# Patient Record
Sex: MALE | Race: WHITE | NOT HISPANIC OR LATINO | Employment: OTHER | URBAN - METROPOLITAN AREA
[De-identification: names, ages, dates, MRNs, and addresses within clinical notes are randomized per-mention and may not be internally consistent; named-entity substitution may affect disease eponyms.]

---

## 2018-10-30 ENCOUNTER — ANESTHESIA - HEALTHEAST (OUTPATIENT)
Dept: SURGERY | Facility: HOSPITAL | Age: 68
End: 2018-10-30

## 2018-10-30 ASSESSMENT — MIFFLIN-ST. JEOR: SCORE: 1796.8

## 2018-10-31 ENCOUNTER — SURGERY - HEALTHEAST (OUTPATIENT)
Dept: SURGERY | Facility: HOSPITAL | Age: 68
End: 2018-10-31

## 2018-10-31 ASSESSMENT — MIFFLIN-ST. JEOR: SCORE: 1796.8

## 2020-08-10 ENCOUNTER — HOSPITAL ENCOUNTER (OUTPATIENT)
Dept: GENERAL RADIOLOGY | Facility: OTHER | Age: 70
End: 2020-08-10
Attending: FAMILY MEDICINE
Payer: COMMERCIAL

## 2020-08-10 ENCOUNTER — OFFICE VISIT (OUTPATIENT)
Dept: FAMILY MEDICINE | Facility: OTHER | Age: 70
End: 2020-08-10
Attending: FAMILY MEDICINE
Payer: COMMERCIAL

## 2020-08-10 VITALS
RESPIRATION RATE: 20 BRPM | WEIGHT: 243.8 LBS | DIASTOLIC BLOOD PRESSURE: 84 MMHG | HEART RATE: 72 BPM | OXYGEN SATURATION: 94 % | TEMPERATURE: 96.9 F | SYSTOLIC BLOOD PRESSURE: 152 MMHG

## 2020-08-10 DIAGNOSIS — S69.91XA INJURY OF RIGHT WRIST, INITIAL ENCOUNTER: ICD-10-CM

## 2020-08-10 DIAGNOSIS — S20.212A CONTUSION OF RIB ON LEFT SIDE, INITIAL ENCOUNTER: Primary | ICD-10-CM

## 2020-08-10 DIAGNOSIS — R07.81 RIB PAIN ON LEFT SIDE: ICD-10-CM

## 2020-08-10 DIAGNOSIS — S62.111A CLOSED CHIP FRACTURE OF TRIQUETRUM OF RIGHT WRIST, INITIAL ENCOUNTER: ICD-10-CM

## 2020-08-10 PROBLEM — I25.118 ATHEROSCLEROSIS OF NATIVE CORONARY ARTERY OF NATIVE HEART WITH STABLE ANGINA PECTORIS (H): Status: ACTIVE | Noted: 2018-03-21

## 2020-08-10 PROBLEM — E78.2 MIXED HYPERLIPIDEMIA: Status: ACTIVE | Noted: 2017-11-13

## 2020-08-10 PROBLEM — M17.12 PRIMARY OSTEOARTHRITIS OF LEFT KNEE: Status: ACTIVE | Noted: 2019-10-16

## 2020-08-10 PROCEDURE — 71101 X-RAY EXAM UNILAT RIBS/CHEST: CPT | Mod: RT

## 2020-08-10 PROCEDURE — 73110 X-RAY EXAM OF WRIST: CPT | Mod: RT

## 2020-08-10 PROCEDURE — 99202 OFFICE O/P NEW SF 15 MIN: CPT | Performed by: FAMILY MEDICINE

## 2020-08-10 PROCEDURE — G0463 HOSPITAL OUTPT CLINIC VISIT: HCPCS

## 2020-08-10 PROCEDURE — 71101 X-RAY EXAM UNILAT RIBS/CHEST: CPT | Mod: LT

## 2020-08-10 RX ORDER — ROSUVASTATIN CALCIUM 40 MG/1
40 TABLET, COATED ORAL
COMMUNITY
Start: 2020-02-13

## 2020-08-10 RX ORDER — OXYBUTYNIN CHLORIDE 15 MG/1
15 TABLET, EXTENDED RELEASE ORAL
COMMUNITY
Start: 2019-05-08

## 2020-08-10 RX ORDER — ASPIRIN 81 MG/1
81 TABLET ORAL DAILY
COMMUNITY
Start: 2018-10-31

## 2020-08-10 RX ORDER — HYDRALAZINE HYDROCHLORIDE 50 MG/1
50 TABLET, FILM COATED ORAL 3 TIMES DAILY
COMMUNITY
Start: 2020-02-13 | End: 2021-02-12

## 2020-08-10 RX ORDER — AMLODIPINE BESYLATE 5 MG/1
5 TABLET ORAL DAILY
COMMUNITY

## 2020-08-10 RX ORDER — ATENOLOL 25 MG/1
25 TABLET ORAL
COMMUNITY

## 2020-08-10 RX ORDER — NITROGLYCERIN 0.4 MG/1
0.4 TABLET SUBLINGUAL
COMMUNITY

## 2020-08-10 RX ORDER — ATORVASTATIN CALCIUM 80 MG/1
80 TABLET, FILM COATED ORAL
COMMUNITY

## 2020-08-10 RX ORDER — ZOLPIDEM TARTRATE 10 MG/1
10 TABLET ORAL
COMMUNITY
Start: 2020-03-23

## 2020-08-10 RX ORDER — ISOSORBIDE MONONITRATE 30 MG/1
30 TABLET, EXTENDED RELEASE ORAL
COMMUNITY

## 2020-08-10 RX ORDER — DIAZEPAM 5 MG
5-10 TABLET ORAL
COMMUNITY
Start: 2020-08-02

## 2020-08-10 RX ORDER — OXYCODONE AND ACETAMINOPHEN 5; 325 MG/1; MG/1
1-2 TABLET ORAL
COMMUNITY
Start: 2018-10-31

## 2020-08-10 RX ORDER — METOPROLOL SUCCINATE 25 MG/1
TABLET, EXTENDED RELEASE ORAL
COMMUNITY
Start: 2019-02-22

## 2020-08-10 RX ORDER — NYSTATIN 100000 U/G
CREAM TOPICAL
COMMUNITY
Start: 2019-03-22

## 2020-08-10 ASSESSMENT — PAIN SCALES - GENERAL: PAINLEVEL: WORST PAIN (10)

## 2020-08-10 NOTE — NURSING NOTE
Chief Complaint   Patient presents with     Musculoskeletal Problem     Patient is here for pain in his right wrist and left ribs that started last night around 6pm after he fell down some steps. Patient did try 2 percocet with no relief.     Initial BP (!) 152/84   Pulse 72   Temp 96.9  F (36.1  C) (Tympanic)   Resp 20   Wt 110.6 kg (243 lb 12.8 oz)   SpO2 94%  There is no height or weight on file to calculate BMI.  Medication Reconciliation: complete    Emily Arreaga LPN

## 2020-08-10 NOTE — PROGRESS NOTES
"xr wrist  Nursing Notes:   Emily Arreaga LPN  8/10/2020  9:53 AM  Sign at exiting of workspace  Chief Complaint   Patient presents with     Musculoskeletal Problem     Patient is here for pain in his right wrist and left ribs that started last night around 6pm after he fell down some steps. Patient did try 2 percocet with no relief.     Initial BP (!) 152/84   Pulse 72   Temp 96.9  F (36.1  C) (Tympanic)   Resp 20   Wt 110.6 kg (243 lb 12.8 oz)   SpO2 94%  There is no height or weight on file to calculate BMI.  Medication Reconciliation: complete    Emily Arreaga LPN    SUBJECTIVE:  Jhonny Lovett is a 70 year old male who sustained a right wrist injury last evening.  Mechanism of injury: He was taking a suitcase and other things into a hotel room, tripped on a step and fell. Uncertain if wrist struck ground but fell more onto his left side and has left rib area pain. No breathing difficulties. Did not ice. Took percocet he has for back pain and \"it didn't help much\". Here for a short vacation and had planned to golf. Immediate symptoms: immediate pain. Symptoms have been unchanged since that time. Prior history of related problems: no prior problems with this area in the past.    OBJECTIVE:  Vital signs:  Temp: 96.9  F (36.1  C) Temp src: Tympanic BP: (!) 152/84 Pulse: 72   Resp: 20 SpO2: 94 %       Weight: 110.6 kg (243 lb 12.8 oz)  There is no height or weight on file to calculate BMI.    Appearance: cooperative.  Wrist exam: soft tissue tenderness and swelling at the distal radius without deformity.  Tender left anterior lower ribs without crepitus. Lungs clear on auscultation.   X-ray:  Results for orders placed or performed in visit on 08/10/20   XR Ribs & Chest Lt 3v     Status: None    Narrative    PROCEDURE: XR RIBS & CHEST LT 3VW 8/10/2020 10:26 AM    HISTORY: Rib pain on left side    COMPARISONS: None.    TECHNIQUE: PA view of the chest and 3 views of left ribs.    FINDINGS: Heart is probably " normal in size. There is some mild diffuse  interstitial prominence without confluent infiltrate or pleural  effusion. Without prior images it is uncertain if this is due to some  chronic interstitial fibrosis or more acute interstitial infiltrate or  edema. Chronic changes are favored.    No rib fracture is seen. There is no pneumothorax.         Impression    IMPRESSION: No acute fracture.    JOAQUIM SEGURA MD   Results for orders placed or performed during the hospital encounter of 08/10/20   XR Wrist Right G/E 3 Views     Status: None    Narrative    PROCEDURE: XR WRIST RT G/E 3 VW 8/10/2020 10:27 AM    HISTORY: Injury of right wrist, initial encounter    COMPARISONS: None.    TECHNIQUE: 3 views.    FINDINGS: A small bony density seen dorsally on the lateral view is  consistent with a triquetral chip fracture.    No other fracture or dislocation is seen.    Mild degenerative changes seen in the first carpal metacarpal joint  and in the metacarpal phalangeal joint of the thumb.         Impression    IMPRESSION: Small triquetral radial chip fracture.    JOAQUIM SEGURA MD     I have personally reviewed the x rays listed above.      ASSESSMENT:  1. Contusion of rib on left side, initial encounter    2. Injury of right wrist, initial encounter    3. Rib pain on left side    4. Closed chip fracture of triquetrum of right wrist, initial encounter            PLAN:  No immobilization needed for the chip noted as he is having minimal pain at site. Ice and NSAIDs prn and has percocet from his provider.  Ribs will be sore for quite some time.   Follow up if new issues or not improving.  Kirsten Mirza MD  11:47 AM 8/10/2020

## 2020-08-10 NOTE — PATIENT INSTRUCTIONS
Patient Education     Rib Contusion     A rib contusion is a bruise to one or more rib bones. It may cause pain, tenderness, swelling and a purplish discoloration. There may be a sharp pain while breathing.  You will be assessed for other injuries. You will likely be given pain medicine. Rib contusions heal on their own, without further treatment. However, pain may take weeks to months to go away.   Note that a small crack (fracture) in the rib may cause the same symptoms as a rib contusion. The small crack may not be seen on a chest X-ray. However, the conditions are managed in the same way.  Home care    Rest. Avoid heavy lifting, strenuous exertion, or any activity that causes pain.    Ice the area to reduce pain and swelling. Put ice cubes in a plastic bag or use a cold pack. (Wrap the cold source in a thin towel. Do not place it directly on your skin.) Ice the injured area for 20 minutes every 1 to 2 hours the first day. Continue with ice packs 3 to 4 times a day for the next 2 days, then as needed for the relief of pain and swelling.    Take any prescribed pain medicine as directed by your healthcare provider. If none was prescribed, take acetaminophen, ibuprofen, or naproxen to control pain.    If you have a significant injury, you may be given a device called an incentive spirometer to keep your lungs healthy. Use as directed.  Follow-up care  Follow up with your healthcare provider during the next week or as directed.  When to seek medical advice  Call your healthcare provider for any of the following:    Shortness of breath or trouble breathing    Increasing chest pain with breathing    Coughing    Dizziness, weakness, or fainting    New or worsening pain    Fever of 100.4 F (38 C) or higher, or as directed by your healthcare provider  Date Last Reviewed: 2/1/2017 2000-2019 [employer]. [address] [place] [address], [place] [address]. All rights reserved. This information is not intended as a  substitute for professional medical care. Always follow your healthcare professional's instructions.

## 2020-08-14 ENCOUNTER — TELEPHONE (OUTPATIENT)
Dept: FAMILY MEDICINE | Facility: OTHER | Age: 70
End: 2020-08-14

## 2020-08-16 NOTE — TELEPHONE ENCOUNTER
After patient's name and date of birth were verified, the imaging results from 8/10/20 were given to patient who verbalized understanding.    Emily Arreaga LPN 8/16/2020 4:23 PM

## 2021-04-12 ENCOUNTER — PATIENT OUTREACH (OUTPATIENT)
Dept: FAMILY MEDICINE | Facility: OTHER | Age: 71
End: 2021-04-12

## 2021-04-12 NOTE — LETTER
April 12, 2021      Jhonny Shanksjanay  [address]  [facility] [address]      Your healthcare team cares about your health. To provide you with the best care,   we have reviewed your chart and based on our findings, we see that you are due to:     - DIABETES FOLLOW UP: Schedule a diabetic follow up appointment as a Office Visit. Patients with diabetes should generally see their provider every 3-6 months.    Schedule a DIABETIC EYE EXAM.  This exam is done with an optometrist. You can schedule this appointment with your eye doctor.  If you need a referral, please let us know.  - COLON CANCER SCREENING:  Call the clinic to schedule your colonoscopy or Cologuard test.  - ANNUAL WELLNESS FOLLOW UP:   Schedule an Annual Medicare Wellness Exam. This can be done by in person visit or virtual video visit.   - OTHER FOLLOW UP:  Preventative care visit, advanced care planning, immunizations, generalized lab testing    If you have already completed these items, please contact the clinic via phone or   Mychart so your care team can review and update your records. Thank you for   choosing [facility] for your healthcare needs. For any questions,   concerns, or to schedule an appointment please contact the clinic.       Healthy Regards,      Your [facility] Care Team

## 2021-04-13 NOTE — TELEPHONE ENCOUNTER
Patient Quality Outreach      Summary:    Patient has the following on his problem list/HM:     Diabetes    Last A1C:  No results found for: A1C    Last LDL:    No results found for: LDL    Is the patient on a Statin? Yes          Is the patient on Aspirin? Yes    Medications     HMG CoA Reductase Inhibitors     atorvastatin (LIPITOR) 80 MG tablet        rosuvastatin (CRESTOR) 40 MG tablet       Salicylates     aspirin 81 MG EC tablet             Last three blood pressure readings:  BP Readings from Last 3 Encounters:   08/10/20 (!) 152/84            Tobacco Use      Smoking status: Former Smoker        Quit date: 6/10/2020        Years since quittin.8      Smokeless tobacco: Never Used          Immunizations       Health Maintenance Due   Topic     Diptheria Tetanus Pertussis (DTAP/TDAP/TD) Vaccine (1 - Tdap)     Flu Vaccine (1)         Patient is due/failing the following:   A1C, LDL (Fasting), Eye Exam, Microalbumin, Statin, BP Check, LDL (Fasting), A1C and Microalbumin and Diabetic Follow-Up Visit, Colonoscopy, Adult/Adolescent physical, date due:  and Immunizations    Type of outreach:    Sent letter.    Questions for provider review:    None                                                                                                                                     Lida Mcfarlane PA-C  2021  7:30 PM       Chart routed to N/A.

## 2021-06-02 VITALS — BODY MASS INDEX: 37.83 KG/M2 | WEIGHT: 241 LBS | HEIGHT: 67 IN

## 2021-06-21 NOTE — ANESTHESIA POSTPROCEDURE EVALUATION
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  Anesthesia type: spinal    Patient location: PACU  Last vitals:   Vitals:    10/31/18 1531   BP: 142/71   Pulse: 66   Resp: 18   Temp: 36.4  C (97.6  F)   SpO2: 97%     Post vital signs: stable  Level of consciousness: awake and responds to simple questions  Post-anesthesia pain: pain controlled  Post-anesthesia nausea and vomiting: no  Pulmonary: unassisted, return to baseline  Cardiovascular: stable and blood pressure at baseline  Hydration: adequate  Anesthetic events: no    QCDR Measures:  ASA# 11 - Torie-op Cardiac Arrest: ASA11B - Patient did NOT experience unanticipated cardiac arrest  ASA# 12 - Torie-op Mortality Rate: ASA12B - Patient did NOT die  ASA# 13 - PACU Re-Intubation Rate: ASA13B - Patient did NOT require a new airway mgmt  ASA# 10 - Composite Anes Safety: ASA10A - No serious adverse event    Additional Notes:

## 2021-06-21 NOTE — ANESTHESIA CARE TRANSFER NOTE
Last vitals:   Vitals:    10/31/18 1340   BP: 131/65   Pulse: 78   Resp: 22   Temp: 36.6  C (97.9  F)   SpO2: 92%     Patient's level of consciousness is drowsy  Spontaneous respirations: yes  Maintains airway independently: yes  Dentition unchanged: yes  Oropharynx: oropharynx clear of all foreign objects    QCDR Measures:  ASA# 20 - Surgical Safety Checklist: WHO surgical safety checklist completed prior to induction  PQRS# 430 - Adult PONV Prevention: 4558F - Pt received => 2 anti-emetic agents (different classes) preop & intraop  ASA# 8 - Peds PONV Prevention: NA - Not pediatric patient, not GA or 2 or more risk factors NOT present  PQRS# 424 - Torie-op Temp Management: 4559F - At least one body temp DOCUMENTED => 35.5C or 95.9F within required timeframe  PQRS# 426 - PACU Transfer Protocol: - Transfer of care checklist used  ASA# 14 - Acute Post-op Pain: ASA14B - Patient did NOT experience pain >= 7 out of 10

## 2021-06-21 NOTE — ANESTHESIA PROCEDURE NOTES
Spinal Block    Patient location during procedure: OR  Start time: 10/31/2018 11:40 AM  End time: 10/31/2018 11:44 AM  Reason for block: primary anesthetic    Staffing:  Performing  Anesthesiologist: NEWTON HUERTAS    Preanesthetic Checklist  Completed: patient identified, risks, benefits, and alternatives discussed, timeout performed, consent obtained, at patient's request, airway assessed, oxygen available, suction available, emergency drugs available and hand hygiene performed  Spinal Block  Patient position: sitting  Prep: ChloraPrep  Patient monitoring: heart rate, cardiac monitor, continuous pulse ox and blood pressure  Approach: right paramedian  Location: L4-5  Injection technique: single-shot  Needle type: pencil-tip   Needle gauge: 24 G    Assessment  Sensory level: T8    Additional Notes:  mela kyle

## 2021-06-21 NOTE — ANESTHESIA PREPROCEDURE EVALUATION
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

## 2021-07-04 ENCOUNTER — HEALTH MAINTENANCE LETTER (OUTPATIENT)
Age: 71
End: 2021-07-04

## 2021-10-24 ENCOUNTER — HEALTH MAINTENANCE LETTER (OUTPATIENT)
Age: 71
End: 2021-10-24

## 2022-02-13 ENCOUNTER — HEALTH MAINTENANCE LETTER (OUTPATIENT)
Age: 72
End: 2022-02-13

## 2022-06-05 ENCOUNTER — HEALTH MAINTENANCE LETTER (OUTPATIENT)
Age: 72
End: 2022-06-05

## 2022-07-31 ENCOUNTER — HEALTH MAINTENANCE LETTER (OUTPATIENT)
Age: 72
End: 2022-07-31

## 2022-10-15 ENCOUNTER — HEALTH MAINTENANCE LETTER (OUTPATIENT)
Age: 72
End: 2022-10-15

## 2023-03-26 ENCOUNTER — HEALTH MAINTENANCE LETTER (OUTPATIENT)
Age: 73
End: 2023-03-26

## 2023-08-20 ENCOUNTER — HEALTH MAINTENANCE LETTER (OUTPATIENT)
Age: 73
End: 2023-08-20

## 2024-01-07 ENCOUNTER — HEALTH MAINTENANCE LETTER (OUTPATIENT)
Age: 74
End: 2024-01-07